# Patient Record
Sex: FEMALE | Race: WHITE | Employment: UNEMPLOYED | URBAN - METROPOLITAN AREA
[De-identification: names, ages, dates, MRNs, and addresses within clinical notes are randomized per-mention and may not be internally consistent; named-entity substitution may affect disease eponyms.]

---

## 2024-01-18 ENCOUNTER — OFFICE VISIT (OUTPATIENT)
Dept: OBGYN CLINIC | Facility: CLINIC | Age: 29
End: 2024-01-18

## 2024-01-18 VITALS
HEIGHT: 69 IN | WEIGHT: 161 LBS | HEART RATE: 69 BPM | SYSTOLIC BLOOD PRESSURE: 130 MMHG | DIASTOLIC BLOOD PRESSURE: 83 MMHG | RESPIRATION RATE: 18 BRPM | BODY MASS INDEX: 23.85 KG/M2

## 2024-01-18 DIAGNOSIS — Z78.9 LANGUAGE BARRIER: ICD-10-CM

## 2024-01-18 DIAGNOSIS — Z30.09 ENCOUNTER FOR COUNSELING REGARDING CONTRACEPTION: Primary | ICD-10-CM

## 2024-01-18 DIAGNOSIS — Z30.013 ENCOUNTER FOR INITIAL PRESCRIPTION OF INJECTABLE CONTRACEPTIVE: ICD-10-CM

## 2024-01-18 PROBLEM — Z75.8 LANGUAGE BARRIER: Status: ACTIVE | Noted: 2024-01-18

## 2024-01-18 PROBLEM — Z60.3 LANGUAGE BARRIER: Status: ACTIVE | Noted: 2024-01-18

## 2024-01-18 PROCEDURE — 99203 OFFICE O/P NEW LOW 30 MIN: CPT | Performed by: NURSE PRACTITIONER

## 2024-01-18 RX ORDER — MEDROXYPROGESTERONE ACETATE 150 MG/ML
150 INJECTION, SUSPENSION INTRAMUSCULAR
Qty: 1 ML | Refills: 1 | Status: SHIPPED | OUTPATIENT
Start: 2024-01-18

## 2024-01-18 NOTE — PROGRESS NOTES
Assessment/Plan:    No problem-specific Assessment & Plan notes found for this encounter.       Diagnoses and all orders for this visit:    Encounter for counseling regarding contraception  -     medroxyPROGESTERone (DEPO-PROVERA) 150 mg/mL injection; Inject 1 mL (150 mg total) into a muscle every 3 (three) months    Encounter for initial prescription of injectable contraceptive  -     medroxyPROGESTERone (DEPO-PROVERA) 150 mg/mL injection; Inject 1 mL (150 mg total) into a muscle every 3 (three) months  Patient to  injection, discount card given to patient for good Rx.  Return to office on Monday for her injection    Reviewed Depo Provera for contraception, use, side effects, efficacy, injection schedule,  reviewed irregular bleeding pattern, possible 5 lb weight gain the first year, risk for decrease in bone density- recommend daily dietary calcium, daily multivitamin with vitamin D.  Reviewed BUM x7 days. Reviewed to abstain or use BUM when late for injections.    Language barrier          Subjective:      Patient ID: Jasmin Jimenez is a 28 y.o. female.    HPI 27 yo   here for contraception. She was on contraception  injection in [place], last injection was in August.  Menses were monthly without contraception, she does report history of irregular menses where she will sometimes skip 1 month.  She is sexually active and uses condoms. Her LMP was 2023. Same partner x 1 year.  She reports   used [id number].   Last annual/pap was 1 year ago and was negative per pt    The following portions of the patient's history were reviewed and updated as appropriate: allergies, current medications, past family history, past medical history, past social history, past surgical history, and problem list.    Review of Systems   Respiratory: Negative.     Cardiovascular: Negative.    Gastrointestinal: Negative.    Genitourinary:  Negative for menstrual problem.      "    Objective:      /83 (BP Location: Right arm, Patient Position: Sitting, Cuff Size: Adult)   Pulse 69   Resp 18   Ht 5' 8.9\" (1.75 m)   Wt 73 kg (161 lb)   LMP 12/30/2023 (Approximate)   BMI 23.85 kg/m²          Physical Exam  Constitutional:       Appearance: Normal appearance.   Cardiovascular:      Rate and Rhythm: Normal rate.   Pulmonary:      Effort: Pulmonary effort is normal.           "

## 2024-01-22 ENCOUNTER — CLINICAL SUPPORT (OUTPATIENT)
Dept: OBGYN CLINIC | Facility: CLINIC | Age: 29
End: 2024-01-22

## 2024-01-22 DIAGNOSIS — Z30.42 ENCOUNTER FOR MANAGEMENT AND INJECTION OF DEPO-PROVERA: Primary | ICD-10-CM

## 2024-01-22 LAB — SL AMB POCT URINE HCG: NEGATIVE

## 2024-01-22 PROCEDURE — 81025 URINE PREGNANCY TEST: CPT

## 2024-01-22 PROCEDURE — 96372 THER/PROPH/DIAG INJ SC/IM: CPT

## 2024-01-22 RX ORDER — MEDROXYPROGESTERONE ACETATE 150 MG/ML
150 INJECTION, SUSPENSION INTRAMUSCULAR
Status: SHIPPED | OUTPATIENT
Start: 2024-01-22

## 2024-01-22 RX ADMIN — MEDROXYPROGESTERONE ACETATE 150 MG: 150 INJECTION, SUSPENSION INTRAMUSCULAR at 14:54

## 2024-01-22 NOTE — PROGRESS NOTES
Depo-Provera     [x]   Patient provided box YES   1 Refills remain  Refills submitted no  Last  Annual Date / Birth control check : 01/18/2024  Last Depo date: 01/22/2024  Side effects: no  HCG: yes  if applicable: negative  Given by: Karrie Welch  Site: LD    Calcium supplement daily teaching  Condoms for 2 weeks following first injection dose.

## 2024-02-05 ENCOUNTER — ANNUAL EXAM (OUTPATIENT)
Dept: OBGYN CLINIC | Facility: CLINIC | Age: 29
End: 2024-02-05

## 2024-02-05 VITALS
HEART RATE: 97 BPM | BODY MASS INDEX: 26.46 KG/M2 | DIASTOLIC BLOOD PRESSURE: 72 MMHG | SYSTOLIC BLOOD PRESSURE: 112 MMHG | HEIGHT: 65 IN | WEIGHT: 158.8 LBS

## 2024-02-05 DIAGNOSIS — Z20.2 POSSIBLE EXPOSURE TO STD: ICD-10-CM

## 2024-02-05 DIAGNOSIS — Z01.419 ENCOUNTER FOR GYNECOLOGICAL EXAMINATION WITHOUT ABNORMAL FINDING: Primary | ICD-10-CM

## 2024-02-05 DIAGNOSIS — Z78.9 LANGUAGE BARRIER: ICD-10-CM

## 2024-02-05 DIAGNOSIS — Z30.42 ENCOUNTER FOR SURVEILLANCE OF INJECTABLE CONTRACEPTIVE: ICD-10-CM

## 2024-02-05 PROCEDURE — 87591 N.GONORRHOEAE DNA AMP PROB: CPT | Performed by: NURSE PRACTITIONER

## 2024-02-05 PROCEDURE — G0145 SCR C/V CYTO,THINLAYER,RESCR: HCPCS | Performed by: NURSE PRACTITIONER

## 2024-02-05 PROCEDURE — 87491 CHLMYD TRACH DNA AMP PROBE: CPT | Performed by: NURSE PRACTITIONER

## 2024-02-05 PROCEDURE — 99395 PREV VISIT EST AGE 18-39: CPT | Performed by: NURSE PRACTITIONER

## 2024-02-05 RX ORDER — MEDROXYPROGESTERONE ACETATE 150 MG/ML
150 INJECTION, SUSPENSION INTRAMUSCULAR
Qty: 1 ML | Refills: 3 | Status: SHIPPED | OUTPATIENT
Start: 2024-02-05

## 2024-02-05 NOTE — PROGRESS NOTES
ASSESSMENT & PLAN: Jasmin Jimenez is a 28 y.o.  with normal gynecologic exam.    1.  Routine well woman exam done today  2.  Pap and HPV:  The patient's last pap was 1 year ago, was done in [place].    It was normal per patient.    Pap was done today.    Current ASCCP Guidelines reviewed.   3.  The following were reviewed in today's visit: breast self exam, STD testing, HIV risk factors and prevention, adequate intake of calcium and vitamin D, exercise, and healthy diet. Desires STD testing and was ordered   4. Depo provera for contraception.Rx was renewed for the year. RTO for next Depo when due     Depression Screening Follow-up Plan: Patient's depression screening was negative with a PHQ-2 score of 1. Their PHQ-9 score was 3. Clinically patient does not have depression. No treatment is required.    BMI Counseling: Body mass index is 26.43 kg/m². The BMI is above normal. Nutrition recommendations include 3-5 servings of fruits/vegetables daily, moderation in carbohydrate intake, and reducing intake of cholesterol. Exercise recommendations include exercising 3-5 times per week.    CC:  Annual Gynecologic Examination    HPI: Jasmin Jimenez is a 28 y.o.  who presents for annual gynecologic examination. [id number] used.    She  is due for her Pap smear today.  She was started on Depo-Provera 2024 for contraception  She has the following concerns:  none    Health Maintenance:    She wears her seatbelt routinely.    She does perform irregular monthly self breast exams.    She feels safe at home.     Past Medical History:   Diagnosis Date    Migraine        History reviewed. No pertinent surgical history.    Past OB/Gyn History:  OB History          1    Para   1    Term   1            AB        Living   1         SAB        IAB        Ectopic        Multiple        Live Births   1               Pt does not have menstrual issues. Monthly x6 d    History of sexually transmitted infection: No.  History of abnormal pap smears: No .    Patient is currently sexually active.  heterosexual.  The current method of family planning is Depo-Provera injections.    Family History   Problem Relation Age of Onset    No Known Problems Mother     No Known Problems Father     No Known Problems Brother     No Known Problems Brother     Breast cancer Maternal Grandmother     No Known Problems Son     Deep vein thrombosis Neg Hx     Pulmonary embolism Neg Hx     Stroke Neg Hx     Heart attack Neg Hx     Colon cancer Neg Hx     Ovarian cancer Neg Hx        Social History:  Social History     Socioeconomic History    Marital status: Single     Spouse name: Not on file    Number of children: Not on file    Years of education: Not on file    Highest education level: Not on file   Occupational History    Not on file   Tobacco Use    Smoking status: Never    Smokeless tobacco: Never   Vaping Use    Vaping status: Never Used   Substance and Sexual Activity    Alcohol use: Never    Drug use: Never    Sexual activity: Yes     Partners: Male     Birth control/protection: Condom Male, Injection   Other Topics Concern    Not on file   Social History Narrative    Not on file     Social Determinants of Health     Financial Resource Strain: Low Risk  (1/18/2024)    Overall Financial Resource Strain (CARDIA)     Difficulty of Paying Living Expenses: Not hard at all   Food Insecurity: No Food Insecurity (1/18/2024)    Hunger Vital Sign     Worried About Running Out of Food in the Last Year: Never true     Ran Out of Food in the Last Year: Never true   Transportation Needs: No Transportation Needs (1/18/2024)    PRAPARE - Transportation     Lack of Transportation (Medical): No     Lack of Transportation (Non-Medical): No   Physical Activity: Not on file   Stress: Not on file   Social Connections: Not on file   Intimate Partner Violence: Not At Risk (2/5/2024)    Humiliation, Afraid, Rape, and  "Kick questionnaire     Fear of Current or Ex-Partner: No     Emotionally Abused: No     Physically Abused: No     Sexually Abused: No   Housing Stability: Low Risk  (1/18/2024)    Housing Stability Vital Sign     Unable to Pay for Housing in the Last Year: No     Number of Places Lived in the Last Year: 2     Unstable Housing in the Last Year: No     Presently lives with family.  Patient is single.  Patient is currently employed     No Known Allergies      Current Outpatient Medications:     medroxyPROGESTERone (DEPO-PROVERA) 150 mg/mL injection, Inject 1 mL (150 mg total) into a muscle every 3 (three) months, Disp: 1 mL, Rfl: 3    Current Facility-Administered Medications:     medroxyPROGESTERone acetate (DEPO-PROVERA SYRINGE) IM injection 150 mg, 150 mg, Intramuscular, Q3 Months, OZZY Nunes, 150 mg at 01/22/24 1454      Review of Systems  Constitutional :no fever, feels well, no tiredness, no recent weight gain or loss  ENT: no ear ache, no loss of hearing, no nosebleeds or nasal discharge, no sore throat or hoarseness.  Cardiovascular: no complaints of slow or fast heart beat, no chest pain, no palpitations, no leg claudication or lower extremity edema.  Respiratory: no complaints of shortness of shortness of breath, no HERBERT  Breasts:no complaints of breast pain, breast lump, or nipple discharge  Gastrointestinal: no complaints of abdominal pain, constipation, nausea, vomiting, or diarrhea or bloody stools  Genitourinary : no complaints of dysuria, incontinence, pelvic pain, no dysmenorrhea, vaginal discharge or abnormal vaginal bleeding and as noted in HPI.  Musculoskeletal: no complaints of arthralgia, no myalgia, no joint swelling or stiffness, no limb pain or swelling.  Integumentary: no complaints of skin rash or lesion, itching or dry skin  Neurological: no complaints of headache, no confusion, no numbness or tingling, no dizziness or fainting    Objective      /72   Pulse 97   Ht 5' 5\" " (1.651 m)   Wt 72 kg (158 lb 12.8 oz)   LMP 01/30/2024 (Approximate)   BMI 26.43 kg/m²   General:   appears stated age, cooperative, alert normal mood and affect   Neck: normal, supple,trachea midline, no masses   Heart: regular rate and rhythm, S1, S2 normal, no murmur, click, rub or gallop   Lungs: clear to auscultation bilaterally   Breasts: normal appearance, no masses or tenderness, Inspection negative, No nipple retraction or dimpling, No nipple discharge or bleeding, No axillary or supraclavicular adenopathy, Normal to palpation without dominant masses, Taught monthly breast self examination   Abdomen: soft, non-tender, without masses or organomegaly   Vulva: normal female genitalia, Bartholin's, Urethra, Germania normal   Vagina: normal vagina, no discharge, exudate, lesion, or erythema   Urethra: normal   Cervix: PAP done. Friable. GCC done. Nontender.   Uterus: normal size, contour, position, consistency, mobility, non-tender, anteflexed, and mobile   Adnexa: normal adnexa and no mass, fullness, tenderness   Lymphatic palpation of lymph nodes in neck, axilla, groin and/or other locations: no lymphadenopathy or masses noted   Skin normal skin turgor and no rashes.   Psychiatric orientation to person, place, and time: normal. mood and affect: normal

## 2024-02-06 LAB
C TRACH DNA SPEC QL NAA+PROBE: NEGATIVE
N GONORRHOEA DNA SPEC QL NAA+PROBE: NEGATIVE

## 2024-02-08 ENCOUNTER — TELEPHONE (OUTPATIENT)
Dept: OBGYN CLINIC | Facility: CLINIC | Age: 29
End: 2024-02-08

## 2024-02-08 NOTE — TELEPHONE ENCOUNTER
----- Message from OZZY Nunes sent at 2/6/2024  1:20 PM EST -----  Please inform pt that her culture for chlamydia and gonorrhea were negative.  Thank You!

## 2024-02-08 NOTE — TELEPHONE ENCOUNTER
ID [id number]    Attempted to call, left a message referring patient to my chart to review recent test results. Office number provided in message in case of any questions or concerns.

## 2024-02-12 LAB
LAB AP GYN PRIMARY INTERPRETATION: NORMAL
Lab: NORMAL

## 2024-02-15 ENCOUNTER — TELEPHONE (OUTPATIENT)
Dept: OBGYN CLINIC | Facility: CLINIC | Age: 29
End: 2024-02-15

## 2024-02-15 NOTE — TELEPHONE ENCOUNTER
Message left for patient that results are available in MYCHART.  Encouraged to call office with any questions/concerns.

## 2024-02-15 NOTE — TELEPHONE ENCOUNTER
----- Message from OZZY Nunes sent at 2/14/2024 10:20 PM EST -----  Please inform pt that her pap was negative.  Thanks

## 2024-02-21 PROBLEM — Z01.419 ENCOUNTER FOR GYNECOLOGICAL EXAMINATION WITHOUT ABNORMAL FINDING: Status: RESOLVED | Noted: 2024-02-05 | Resolved: 2024-02-21

## 2024-04-09 ENCOUNTER — CLINICAL SUPPORT (OUTPATIENT)
Dept: OBGYN CLINIC | Facility: CLINIC | Age: 29
End: 2024-04-09

## 2024-04-09 DIAGNOSIS — Z30.42 ENCOUNTER FOR MANAGEMENT AND INJECTION OF DEPO-PROVERA: Primary | ICD-10-CM

## 2024-04-09 PROCEDURE — 96372 THER/PROPH/DIAG INJ SC/IM: CPT

## 2024-04-09 RX ADMIN — MEDROXYPROGESTERONE ACETATE 150 MG: 150 INJECTION, SUSPENSION INTRAMUSCULAR at 15:41

## 2024-04-09 NOTE — PROGRESS NOTES
Depo-Provera     [x]   Patient provided box YES   3 Refills remain  Refills submitted no  Last  Annual Date / Birth control check : 01/18/2024  Last Depo date: 01/22/2024  Side effects: no  HCG: yes  if applicable: negative  Given by: Karrie Welch  Site: LD    Calcium supplement daily teaching  Condoms for 2 weeks following first injection dose.

## 2024-06-24 ENCOUNTER — TELEPHONE (OUTPATIENT)
Dept: OTHER | Facility: OTHER | Age: 29
End: 2024-06-24

## 2024-06-24 NOTE — TELEPHONE ENCOUNTER
ID [id number]    Called and spoke to patient, patient is unable to make it to the openings the office has available tomorrow, June 25th. Patient stated she will keep her appt on June 26

## 2024-06-24 NOTE — TELEPHONE ENCOUNTER
Patient would like to have her injection appointment moved to tomorrow June 25, 2024 if possible. Please call patient to follow up.

## 2024-06-26 ENCOUNTER — CLINICAL SUPPORT (OUTPATIENT)
Dept: OBGYN CLINIC | Facility: CLINIC | Age: 29
End: 2024-06-26

## 2024-06-26 DIAGNOSIS — Z30.42 ENCOUNTER FOR MANAGEMENT AND INJECTION OF DEPO-PROVERA: Primary | ICD-10-CM

## 2024-06-26 PROCEDURE — 96372 THER/PROPH/DIAG INJ SC/IM: CPT

## 2024-06-26 RX ADMIN — MEDROXYPROGESTERONE ACETATE 150 MG: 150 INJECTION, SUSPENSION INTRAMUSCULAR at 15:40

## 2024-06-26 NOTE — PROGRESS NOTES
Depo-Provera     [x]   Patient provided box yes   2 Refills remain  Refills submitted no  Last  Annual Date / Birth control check : 2/5/24  Last Depo date: 4/9/24  Side effects: no  HCG: no  if applicable:   Given by: Trang Miranda MA   Site: left deltoid     Calcium supplement daily teaching  Condoms for 2 weeks following first injection dose.

## 2024-07-22 ENCOUNTER — HOSPITAL ENCOUNTER (EMERGENCY)
Facility: HOSPITAL | Age: 29
Discharge: HOME | End: 2024-07-22
Attending: INTERNAL MEDICINE

## 2024-07-22 VITALS
BODY MASS INDEX: 26.46 KG/M2 | RESPIRATION RATE: 18 BRPM | OXYGEN SATURATION: 98 % | DIASTOLIC BLOOD PRESSURE: 72 MMHG | HEART RATE: 76 BPM | SYSTOLIC BLOOD PRESSURE: 123 MMHG | HEIGHT: 65 IN | TEMPERATURE: 98.7 F

## 2024-07-22 DIAGNOSIS — U07.1 COVID-19: Primary | ICD-10-CM

## 2024-07-22 LAB
BACTERIA UR QL AUTO: ABNORMAL /HPF
BILIRUB UR QL STRIP: NEGATIVE
CLARITY UR: CLEAR
COLOR UR: YELLOW
EXT PREGNANCY TEST URINE: NEGATIVE
FLUAV RNA RESP QL NAA+PROBE: NEGATIVE
FLUBV RNA RESP QL NAA+PROBE: NEGATIVE
GLUCOSE UR STRIP-MCNC: NEGATIVE MG/DL
HGB UR QL STRIP.AUTO: ABNORMAL
KETONES UR STRIP-MCNC: NEGATIVE MG/DL
LEUKOCYTE ESTERASE UR QL STRIP: ABNORMAL
MUCOUS THREADS UR QL AUTO: ABNORMAL
NITRITE UR QL STRIP: NEGATIVE
NON-SQ EPI CELLS URNS QL MICRO: ABNORMAL /HPF
PH UR STRIP.AUTO: 6 [PH]
PROT UR STRIP-MCNC: NEGATIVE MG/DL
RBC #/AREA URNS AUTO: ABNORMAL /HPF
RSV RNA RESP QL NAA+PROBE: NEGATIVE
S PYO DNA THROAT QL NAA+PROBE: NOT DETECTED
SARS-COV-2 RNA RESP QL NAA+PROBE: POSITIVE
SP GR UR STRIP.AUTO: >=1.03 (ref 1–1.03)
UROBILINOGEN UR QL STRIP.AUTO: 0.2 E.U./DL
WBC #/AREA URNS AUTO: ABNORMAL /HPF

## 2024-07-22 PROCEDURE — 99284 EMERGENCY DEPT VISIT MOD MDM: CPT | Performed by: PHYSICIAN ASSISTANT

## 2024-07-22 PROCEDURE — 81025 URINE PREGNANCY TEST: CPT | Performed by: PHYSICIAN ASSISTANT

## 2024-07-22 PROCEDURE — 81001 URINALYSIS AUTO W/SCOPE: CPT | Performed by: PHYSICIAN ASSISTANT

## 2024-07-22 PROCEDURE — 99283 EMERGENCY DEPT VISIT LOW MDM: CPT

## 2024-07-22 PROCEDURE — 87651 STREP A DNA AMP PROBE: CPT | Performed by: PHYSICIAN ASSISTANT

## 2024-07-22 PROCEDURE — 0241U HB NFCT DS VIR RESP RNA 4 TRGT: CPT | Performed by: PHYSICIAN ASSISTANT

## 2024-07-22 RX ORDER — IBUPROFEN 600 MG/1
600 TABLET ORAL ONCE
Status: COMPLETED | OUTPATIENT
Start: 2024-07-22 | End: 2024-07-22

## 2024-07-22 RX ORDER — PREDNISONE 20 MG/1
60 TABLET ORAL ONCE
Status: COMPLETED | OUTPATIENT
Start: 2024-07-22 | End: 2024-07-22

## 2024-07-22 RX ADMIN — PREDNISONE 60 MG: 20 TABLET ORAL at 10:02

## 2024-07-22 RX ADMIN — IBUPROFEN 600 MG: 600 TABLET, FILM COATED ORAL at 10:02

## 2024-07-22 NOTE — DISCHARGE INSTRUCTIONS
Use Tylenol every 4 hours or Ibuprofen every 6 hours; you can alternate the 2 medications taking something every 3 hours for pain or fever.      Your COVID test is positive.    Please stay quarantined/isolation in your home.  Do not interact with your family or other people in the community.    You must stay isolation/quarantine for a minimum of 5 days after symptoms have begun and be symptom-free (without fever) for 24 hours before you go out of quarantine/isolation. Then follow strict mask wearing for an additional 5 days.  Get rest and drink plenty of fluids,    Follow-up with your family doctor   Return to the ED with any worsening symptoms

## 2024-07-22 NOTE — Clinical Note
Jasmin Randolph Yonatanerlindaatifjocelin was seen and treated in our emergency department on 7/22/2024.                Diagnosis:     Jasmin  may return to work on return date.    She may return on this date: 07/25/2024         If you have any questions or concerns, please don't hesitate to call.      Sybil Bullock PA-C    ______________________________           _______________          _______________  Hospital Representative                              Date                                Time

## 2024-07-22 NOTE — ED PROVIDER NOTES
History  Chief Complaint   Patient presents with    Pain     Reports bone and eye pain, nausea and dizziness since Thursday, taking Tylenol with mild improvement     HPI: Patient is a 28 y.o. female who presents with 4 days of dry cough, intermittent, generalized headache, sore throat, myalgias, and nausea which the patient describes at mild .  The patient has had contact with people with similar symptoms, child is home with fever as well.    The patient has Tylenol at home, but didn't take anything for symptoms today.    No Known Allergies    Past Medical History:  No date: Migraine   History reviewed. No pertinent surgical history.  Social History    Tobacco Use      Smoking status: Never      Smokeless tobacco: Never    Vaping Use      Vaping status: Never Used    Alcohol use: Never    Drug use: Never      Nursing notes reviewed  Physical Exam:  ED Triage Vitals [07/22/24 0926]  Temperature: 98.7 °F (37.1 °C)  Pulse: 76  Respirations: 18  Blood Pressure: 123/72  SpO2: 98 %  Temp Source: Oral  Heart Rate Source: Monitor  Patient Position - Orthostatic VS: Lying  BP Location: Left arm  FiO2 (%): n/a  Pain Score: 7    ROS: Positive for cough, headache, sore throat, myalgias, nausea, the remainder of a 10 organ system ROS was otherwise unremarkable.    General: awake, alert, no acute distress  Head: normocephalic, atraumatic  Eyes: no scleral icterus  Ears: external ears normal, hearing grossly intact, TMs intact bilaterally, no erythema  Pharynx:, Mild diffuse erythema noted along posterior pharynx, no exudate, uvula midline, no sign of abscess  Nose: external exam grossly normal, negative nasal discharge  Neck: symmetric, No JVD noted, trachea midline, no lymphangitis  Pulmonary: no respiratory distress, no tachypnea noted, no wheezing, no rhonchi  Cardiovascular: appears well perfused  Abdomen: no distention noted  Musculoskeletal: no deformities noted, tone normal  Neuro: grossly non-focal  Psych: mood and affect  appropriate    The patient is stable and has a history and physical exam consistent with a viral illness. COVID19 testing/strep testing has been performed.  I considered the patient's other medical conditions as applicable/noted above in my medical decision making.  The patient is stable upon discharge. The plan is for supportive care at home.    The patient (and any family present) verbalized understanding of the discharge instructions and warnings that would necessitate return to the Emergency Department.  All questions were answered prior to discharge.    Medications  ibuprofen (MOTRIN) tablet 600 mg (has no administration in time range)  predniSONE tablet 60 mg (has no administration in time range)  Final diagnoses:  None  ED Disposition     None      Follow-up Information    None     Patient's Medications    Discharge Prescriptions  No medications on file    No discharge procedures on file.    Electronically Signed by            Prior to Admission Medications   Prescriptions Last Dose Informant Patient Reported? Taking?   medroxyPROGESTERone (DEPO-PROVERA) 150 mg/mL injection   No No   Sig: Inject 1 mL (150 mg total) into a muscle every 3 (three) months      Facility-Administered Medications Last Administration Doses Remaining   medroxyPROGESTERone acetate (DEPO-PROVERA SYRINGE) IM injection 150 mg 6/26/2024  3:40 PM           Past Medical History:   Diagnosis Date    Migraine        History reviewed. No pertinent surgical history.    Family History   Problem Relation Age of Onset    No Known Problems Mother     No Known Problems Father     No Known Problems Brother     No Known Problems Brother     Breast cancer Maternal Grandmother     No Known Problems Son     Deep vein thrombosis Neg Hx     Pulmonary embolism Neg Hx     Stroke Neg Hx     Heart attack Neg Hx     Colon cancer Neg Hx     Ovarian cancer Neg Hx      I have reviewed and agree with the history as documented.    E-Cigarette/Vaping    E-Cigarette  Use Never User      E-Cigarette/Vaping Substances    Nicotine No     THC No     CBD No     Flavoring No     Other No     Unknown No      Social History     Tobacco Use    Smoking status: Never    Smokeless tobacco: Never   Vaping Use    Vaping status: Never Used   Substance Use Topics    Alcohol use: Never    Drug use: Never       Review of Systems    See above      Physical Exam  Physical Exam    See above    Vital Signs  ED Triage Vitals [07/22/24 0926]   Temperature Pulse Respirations Blood Pressure SpO2   98.7 °F (37.1 °C) 76 18 123/72 98 %      Temp Source Heart Rate Source Patient Position - Orthostatic VS BP Location FiO2 (%)   Oral Monitor Lying Left arm --      Pain Score       7           Vitals:    07/22/24 0926   BP: 123/72   Pulse: 76   Patient Position - Orthostatic VS: Lying         Visual Acuity      ED Medications  Medications   ibuprofen (MOTRIN) tablet 600 mg (600 mg Oral Given 7/22/24 1002)   predniSONE tablet 60 mg (60 mg Oral Given 7/22/24 1002)       Diagnostic Studies  Results Reviewed       Procedure Component Value Units Date/Time    FLU/RSV/COVID - if FLU/RSV clinically relevant [[id number]]  (Abnormal) Collected: 07/22/24 1002    Lab Status: Final result Specimen: Nares from Nose Updated: 07/22/24 1102     SARS-CoV-2 Positive     INFLUENZA A PCR Negative     INFLUENZA B PCR Negative     RSV PCR Negative    Narrative:      FOR PEDIATRIC PATIENTS - copy/paste COVID Guidelines URL to browser: [email or web address]    SARS-CoV-2 assay is a Nucleic Acid Amplification assay intended for the  qualitative detection of nucleic acid from SARS-CoV-2 in nasopharyngeal  swabs. Results are for the presumptive identification of SARS-CoV-2 RNA.    Positive results are indicative of infection with SARS-CoV-2, the virus  causing COVID-19, but do not rule out bacterial infection or co-infection  with other viruses. Laboratories within the [place] and  its  territories are required to report all positive results to the appropriate  public health authorities. Negative results do not preclude SARS-CoV-2  infection and should not be used as the sole basis for treatment or other  patient management decisions. Negative results must be combined with  clinical observations, patient history, and epidemiological information.  This test has not been FDA cleared or approved.    This test has been authorized by FDA under an Emergency Use Authorization  (EUA). This test is only authorized for the duration of time the  declaration that circumstances exist justifying the authorization of the  emergency use of an in vitro diagnostic tests for detection of SARS-CoV-2  virus and/or diagnosis of COVID-19 infection under section 564(b)(1) of  the Act, 21 U.S.C. 360bbb-3(b)(1), unless the authorization is terminated  or revoked sooner. The test has been validated but independent review by FDA  and CLIA is pending.    Test performed using [employer] [facility]: This RT-PCR assay targets N2,  a region unique to SARS-CoV-2. A conserved region in the E-gene was chosen  for pan-Sarbecovirus detection which includes SARS-CoV-2.    According to CMS-2020-01-R, this platform meets the definition of high-throughput technology.    Strep A PCR [[id number]]  (Normal) Collected: 07/22/24 1002    Lab Status: Final result Specimen: Throat Updated: 07/22/24 1047     STREP A PCR Not Detected    Urine Microscopic [[id number]]  (Abnormal) Collected: 07/22/24 1002    Lab Status: Final result Specimen: Urine, Clean Catch Updated: 07/22/24 1040     RBC, UA None Seen /hpf      WBC, UA 0-5 /hpf      Epithelial Cells Moderate /hpf      Bacteria, UA Moderate /hpf      MUCUS THREADS Moderate    UA w Reflex to Microscopic w Reflex to Culture [[id number]]  (Abnormal) Collected: 07/22/24 1002    Lab Status: Final result Specimen: Urine, Clean Catch Updated: 07/22/24 1019     Color, UA Yellow     Clarity, UA Clear      Specific Gravity, UA >=1.030     pH, UA 6.0     Leukocytes, UA Trace     Nitrite, UA Negative     Protein, UA Negative mg/dl      Glucose, UA Negative mg/dl      Ketones, UA Negative mg/dl      Urobilinogen, UA 0.2 E.U./dl      Bilirubin, UA Negative     Occult Blood, UA Trace-Intact    POCT pregnancy, urine [[id number]]  (Normal) Resulted: 07/22/24 1000    Lab Status: Final result Updated: 07/22/24 1004     EXT Preg Test, Ur Negative     Control --                   No orders to display              Procedures  Procedures         ED Course  ED Course as of 07/22/24 1153   Mon Jul 22, 2024   1024 PREGNANCY TEST URINE: Negative   1024 Leukocytes, UA(!): Trace  With Nitrite neg   1108 STREP A PCR: Not Detected   1108 SARS-COV-2(!): Positive                                               Medical Decision Making  Amount and/or Complexity of Data Reviewed  Labs: ordered. Decision-making details documented in ED Course.    Risk  Prescription drug management.                 Disposition  Final diagnoses:   COVID-19     Time reflects when diagnosis was documented in both MDM as applicable and the Disposition within this note       Time User Action Codes Description Comment    7/22/2024 11:31 AM Sybil Bullock Add [U07.1] COVID-19           ED Disposition       ED Disposition   Discharge    Condition   Stable    Date/Time   Mon Jul 22, 2024 11:31 AM    Comment   Jasmin Jimenez discharge to home/self care.                   Follow-up Information       Follow up With Specialties Details Why Contact Info    Your PCP                Discharge Medication List as of 7/22/2024 11:33 AM        CONTINUE these medications which have NOT CHANGED    Details   medroxyPROGESTERone (DEPO-PROVERA) 150 mg/mL injection Inject 1 mL (150 mg total) into a muscle every 3 (three) months, Starting Mon 2/5/2024, Normal             No discharge procedures on file.    PDMP Review       None            ED Provider  Electronically Signed  by             Sybil Bullock PA-C  07/22/24 1158

## 2024-09-12 ENCOUNTER — CLINICAL SUPPORT (OUTPATIENT)
Dept: OBGYN CLINIC | Facility: CLINIC | Age: 29
End: 2024-09-12

## 2024-09-12 DIAGNOSIS — Z30.42 ENCOUNTER FOR MANAGEMENT AND INJECTION OF DEPO-PROVERA: Primary | ICD-10-CM

## 2024-09-12 PROCEDURE — 96372 THER/PROPH/DIAG INJ SC/IM: CPT

## 2024-09-12 RX ADMIN — MEDROXYPROGESTERONE ACETATE 150 MG: 150 INJECTION, SUSPENSION INTRAMUSCULAR at 15:33

## 2024-09-12 NOTE — PROGRESS NOTES
Depo-Provera     [x]   Patient provided box yes   1 Refills remain  Refills submitted no  Last  Annual Date / Birth control check : 02/05/24  Last Depo date: 06/26/24  Side effects: no  HCG: no  if applicable:   Given by: Trang Miranda MA   Site: left deltoid     Calcium supplement daily teaching  Condoms for 2 weeks following first injection dose.

## 2024-09-13 ENCOUNTER — OFFICE VISIT (OUTPATIENT)
Dept: OBGYN CLINIC | Facility: CLINIC | Age: 29
End: 2024-09-13

## 2024-09-13 VITALS
HEIGHT: 65 IN | TEMPERATURE: 98.2 F | RESPIRATION RATE: 18 BRPM | WEIGHT: 156.6 LBS | BODY MASS INDEX: 26.09 KG/M2 | SYSTOLIC BLOOD PRESSURE: 121 MMHG | DIASTOLIC BLOOD PRESSURE: 80 MMHG | HEART RATE: 111 BPM

## 2024-09-13 DIAGNOSIS — R39.9 UTI SYMPTOMS: Primary | ICD-10-CM

## 2024-09-13 LAB
SL AMB  POCT GLUCOSE, UA: NORMAL
SL AMB LEUKOCYTE ESTERASE,UA: NORMAL
SL AMB POCT BILIRUBIN,UA: NORMAL
SL AMB POCT BLOOD,UA: NORMAL
SL AMB POCT CLARITY,UA: NORMAL
SL AMB POCT COLOR,UA: YELLOW
SL AMB POCT KETONES,UA: NORMAL
SL AMB POCT NITRITE,UA: NORMAL
SL AMB POCT PH,UA: 6
SL AMB POCT SPECIFIC GRAVITY,UA: 1.01
SL AMB POCT URINE PROTEIN: NORMAL
SL AMB POCT UROBILINOGEN: NORMAL

## 2024-09-13 PROCEDURE — 87181 SC STD AGAR DILUTION PER AGT: CPT | Performed by: OBSTETRICS & GYNECOLOGY

## 2024-09-13 PROCEDURE — 87077 CULTURE AEROBIC IDENTIFY: CPT | Performed by: OBSTETRICS & GYNECOLOGY

## 2024-09-13 PROCEDURE — 87186 SC STD MICRODIL/AGAR DIL: CPT | Performed by: OBSTETRICS & GYNECOLOGY

## 2024-09-13 PROCEDURE — 81003 URINALYSIS AUTO W/O SCOPE: CPT | Performed by: OBSTETRICS & GYNECOLOGY

## 2024-09-13 PROCEDURE — 99213 OFFICE O/P EST LOW 20 MIN: CPT | Performed by: OBSTETRICS & GYNECOLOGY

## 2024-09-13 PROCEDURE — 87086 URINE CULTURE/COLONY COUNT: CPT | Performed by: OBSTETRICS & GYNECOLOGY

## 2024-09-13 RX ORDER — CIPROFLOXACIN 500 MG/1
500 TABLET, FILM COATED ORAL EVERY 12 HOURS SCHEDULED
Qty: 14 TABLET | Refills: 0 | Status: SHIPPED | OUTPATIENT
Start: 2024-09-13 | End: 2024-09-16

## 2024-09-13 RX ORDER — PHENAZOPYRIDINE HYDROCHLORIDE 95 MG/1
95 TABLET ORAL 3 TIMES DAILY PRN
Qty: 10 TABLET | Refills: 0 | Status: SHIPPED | OUTPATIENT
Start: 2024-09-13 | End: 2024-09-15

## 2024-09-13 NOTE — PROGRESS NOTES
"Assessment/Plan:     No problem-specific Assessment & Plan notes found for this encounter.          Diagnoses and all orders for this visit:    UTI symptoms  -     POCT urine dip auto non-scope  -     Urine culture  -     ciprofloxacin (CIPRO) 500 mg tablet; Take 1 tablet (500 mg total) by mouth every 12 (twelve) hours for 7 days  -     phenazopyridine (PYRIDIUM) 95 MG tablet; Take 1 tablet (95 mg total) by mouth 3 (three) times a day as needed for bladder spasms for up to 2 days    RTO prn or for annual.            Subjective:      Patient ID: Jasmin Jimenez is a 28 y.o. female who presents for an acute problem.     HPI  Patient reports 2 weeks of pain and burning with urination, nausea/vomiting, fever, bladder pain, and back pain unrelieved with ibuprofen or urinary pain relief. Patient denies any vaginal discharge or hematuria.     The following portions of the patient's history were reviewed and updated as appropriate: allergies, current medications, past family history, past medical history, past social history, past surgical history and problem list.    Review of Systems   Constitutional:  Positive for fever.   Gastrointestinal:  Positive for nausea and vomiting.   Genitourinary:  Positive for dysuria and flank pain. Negative for vaginal bleeding and vaginal discharge.         Objective:    Physical exam was positive for bilateral flank pain more pronounced on the right and suprapubic pain.     /80 (BP Location: Right arm, Patient Position: Sitting, Cuff Size: Adult)   Pulse (!) 111   Temp 98.2 °F (36.8 °C) (Tympanic)   Resp 18   Ht 5' 5\" (1.651 m)   Wt 71 kg (156 lb 9.6 oz)   LMP  (LMP Unknown)   BMI 26.06 kg/m²          Physical Exam  Vitals and nursing note reviewed. Exam conducted with a chaperone present.   Constitutional:       Appearance: Normal appearance.   Pulmonary:      Effort: Pulmonary effort is normal.   Abdominal:      Palpations: Abdomen is soft.      Tenderness: " There is abdominal tenderness in the suprapubic area. There is right CVA tenderness and left CVA tenderness.   Neurological:      General: No focal deficit present.      Mental Status: She is alert and oriented to person, place, and time.   Psychiatric:         Mood and Affect: Mood normal.         Behavior: Behavior normal.

## 2024-09-15 LAB — BACTERIA UR CULT: ABNORMAL

## 2024-09-16 ENCOUNTER — TELEPHONE (OUTPATIENT)
Dept: OBGYN CLINIC | Facility: CLINIC | Age: 29
End: 2024-09-16

## 2024-09-16 DIAGNOSIS — R39.9 UTI SYMPTOMS: Primary | ICD-10-CM

## 2024-09-16 LAB — BACTERIA UR CULT: ABNORMAL

## 2024-09-16 NOTE — TELEPHONE ENCOUNTER
ID [id number]    Called and spoke to patient, informed her of the new prescription. Patient verbalized understanding, no questions or concerns.

## 2024-09-16 NOTE — TELEPHONE ENCOUNTER
----- Message from Sara Jaramillo MD sent at 9/16/2024  8:44 AM EDT -----  Please inform urine was not susceptible to original antibiotic, I sent a new prescription to her pharmacy.    TY  ----- Message -----  From: Karrie Welch MA  Sent: 9/13/2024   8:12 AM EDT  To: Sara Jaramillo MD

## 2024-12-03 DIAGNOSIS — Z30.42 ENCOUNTER FOR SURVEILLANCE OF INJECTABLE CONTRACEPTIVE: ICD-10-CM

## 2024-12-03 RX ORDER — MEDROXYPROGESTERONE ACETATE 150 MG/ML
INJECTION, SUSPENSION INTRAMUSCULAR
Qty: 1 ML | Refills: 1 | Status: SHIPPED | OUTPATIENT
Start: 2024-12-03 | End: 2024-12-04 | Stop reason: SDUPTHER

## 2024-12-04 ENCOUNTER — CLINICAL SUPPORT (OUTPATIENT)
Dept: OBGYN CLINIC | Facility: CLINIC | Age: 29
End: 2024-12-04

## 2024-12-04 DIAGNOSIS — Z30.42 ENCOUNTER FOR MANAGEMENT AND INJECTION OF DEPO-PROVERA: Primary | ICD-10-CM

## 2024-12-04 DIAGNOSIS — Z30.42 ENCOUNTER FOR SURVEILLANCE OF INJECTABLE CONTRACEPTIVE: ICD-10-CM

## 2024-12-04 DIAGNOSIS — Z76.0 MEDICATION REFILL: Primary | ICD-10-CM

## 2024-12-04 PROCEDURE — 96372 THER/PROPH/DIAG INJ SC/IM: CPT

## 2024-12-04 RX ORDER — MEDROXYPROGESTERONE ACETATE 150 MG/ML
150 INJECTION, SUSPENSION INTRAMUSCULAR
Qty: 1 ML | Refills: 0 | Status: SHIPPED | OUTPATIENT
Start: 2024-12-04

## 2024-12-04 RX ADMIN — MEDROXYPROGESTERONE ACETATE 150 MG: 150 INJECTION, SUSPENSION INTRAMUSCULAR at 16:09

## 2024-12-04 NOTE — PROGRESS NOTES
Depo-Provera     [x]   Patient provided box YES   0 Refills remain  Refills submitted yes  Last  Annual Date / Birth control check : 02/05/2024  Last Depo date: 09/12/2024  Side effects: no  HCG: no  if applicable:   Given by: Karrie Welch MA    Site: LD    Calcium supplement daily teaching  Condoms for 2 weeks following first injection dose.

## 2025-03-11 ENCOUNTER — OFFICE VISIT (OUTPATIENT)
Dept: INTERNAL MEDICINE CLINIC | Facility: CLINIC | Age: 30
End: 2025-03-11

## 2025-03-11 VITALS
HEIGHT: 66 IN | BODY MASS INDEX: 25.88 KG/M2 | HEART RATE: 87 BPM | SYSTOLIC BLOOD PRESSURE: 104 MMHG | TEMPERATURE: 98.6 F | DIASTOLIC BLOOD PRESSURE: 69 MMHG | WEIGHT: 161 LBS

## 2025-03-11 DIAGNOSIS — M54.50 LOW BACK PAIN POTENTIALLY ASSOCIATED WITH RADICULOPATHY: Primary | ICD-10-CM

## 2025-03-11 PROCEDURE — 99203 OFFICE O/P NEW LOW 30 MIN: CPT | Performed by: FAMILY MEDICINE

## 2025-03-11 RX ORDER — METHOCARBAMOL 500 MG/1
TABLET, FILM COATED ORAL
Qty: 30 TABLET | Refills: 1 | Status: SHIPPED | OUTPATIENT
Start: 2025-03-11

## 2025-03-11 NOTE — ASSESSMENT & PLAN NOTE
See detailed HPI , 4 weeks of sx she never had low back pain before no injury no change in routine , she works as a  in [employer] 4 10 hr shifts per week . Pain is there in any position , not daily + radiating sx to feet bilaeral - numbness or tingling legs can feel weak , no bowel or bladder function changes . Exam normal muscle bulk , neuro exam normal , patient bends stands straight , moves onto and off of exam table easily  normal gait   Recommend avoid offending positions actions , maintain good posture , continue moist heat applications warm showers , advil 400 mg bid - tid with food , add robaxin 500 mg hs and up to 1 8 hr , given home stretches and exercises , follow up in 4 weeks sooner if needed   Orders:    methocarbamol (ROBAXIN) 500 mg tablet; 1 po hs and up to q 8 hr prn back pain

## 2025-03-11 NOTE — PROGRESS NOTES
Name: Jasmin Jimenez      : 1995      MRN: [id number]  Encounter Provider: Aidee Drew MD  Encounter Date: 3/11/2025   Encounter department: [facility]    Assessment & Plan  Low back pain potentially associated with radiculopathy  See detailed HPI , 4 weeks of sx she never had low back pain before no injury no change in routine , she works as a  in [employer] 4 10 hr shifts per week . Pain is there in any position , not daily + radiating sx to feet bilaeral - numbness or tingling legs can feel weak , no bowel or bladder function changes . Exam normal muscle bulk , neuro exam normal , patient bends stands straight , moves onto and off of exam table easily  normal gait   Recommend avoid offending positions actions , maintain good posture , continue moist heat applications warm showers , advil 400 mg bid - tid with food , add robaxin 500 mg hs and up to 1 8 hr , given home stretches and exercises , follow up in 4 weeks sooner if needed   Orders:    methocarbamol (ROBAXIN) 500 mg tablet; 1 po hs and up to q 8 hr prn back pain    BMI Counseling: Body mass index is 26.38 kg/m². The BMI is above normal. Nutrition recommendations include decreasing portion sizes, encouraging healthy choices of fruits and vegetables, decreasing fast food intake, consuming healthier snacks, limiting drinks that contain sugar and reducing intake of saturated and trans fat. Exercise recommendations include exercising 3-5 times per week. Rationale for BMI follow-up plan is due to patient being overweight or obese.     Depression Screening and Follow-up Plan: Patient was screened for depression during today's encounter. They screened negative with a PHQ-2 score of 0.          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 Numbness tingling , no issues with passing urine or moving bowels   She has used moist heat , calms down pain some , she has also taken advil 400 mg a few x per day some help   She has not had any back injuries ,no family history of back problems   Review of Systems   Constitutional:  Negative for chills and fever.   HENT:  Negative for ear pain and sore throat.    Eyes:  Negative for pain and visual disturbance.   Respiratory:  Negative for cough and shortness of breath.    Cardiovascular:  Negative for chest pain and palpitations.   Gastrointestinal:  Negative for abdominal pain and vomiting.   Genitourinary:  Negative for difficulty urinating, dysuria and hematuria.   Musculoskeletal:  Positive for back pain. Negative for arthralgias.   Skin:  Negative for color change and rash.   Neurological:  Negative for seizures and syncope.   Psychiatric/Behavioral:  Positive for sleep disturbance. Negative for self-injury and suicidal ideas.    All other systems reviewed and are negative.    Past Medical History:   Diagnosis Date    Migraine      History reviewed. No pertinent surgical history.  Family History   Problem Relation Age of Onset    No Known Problems Mother     No Known Problems Father     No Known Problems Brother     No Known Problems Brother     Breast cancer Maternal Grandmother     No Known Problems Son     Deep vein thrombosis Neg Hx     Pulmonary embolism Neg Hx     Stroke Neg Hx     Heart attack Neg Hx     Colon cancer Neg Hx     Ovarian cancer Neg Hx      Social History     Tobacco Use    Smoking status: Never    Smokeless tobacco: Never   Vaping Use     "Vaping status: Never Used   Substance and Sexual Activity    Alcohol use: Never    Drug use: Never    Sexual activity: Yes     Partners: Male     Birth control/protection: Condom Male, Injection     Current Outpatient Medications on File Prior to Visit   Medication Sig    medroxyPROGESTERone acetate (DEPO-PROVERA SYRINGE) 150 mg/mL injection Inject 1 mL (150 mg total) into a muscle every 3 (three) months     No Known Allergies    There is no immunization history on file for this patient.  Objective   /69 (BP Location: Left arm, Patient Position: Sitting, Cuff Size: Standard)   Pulse 87   Temp 98.6 °F (37 °C) (Temporal)   Ht 5' 5.5\" (1.664 m)   Wt 73 kg (161 lb)   BMI 26.38 kg/m²     Physical Exam  Vitals and nursing note reviewed.   Constitutional:       General: She is not in acute distress.     Appearance: She is well-developed.      Comments: Skin with good color turgor , well hydrated ,no distress noted     HENT:      Head: Normocephalic and atraumatic.   Eyes:      Conjunctiva/sclera: Conjunctivae normal.   Cardiovascular:      Rate and Rhythm: Normal rate and regular rhythm.      Heart sounds: No murmur heard.  Pulmonary:      Effort: Pulmonary effort is normal. No respiratory distress.      Breath sounds: Normal breath sounds.   Abdominal:      Palpations: Abdomen is soft.      Tenderness: There is no abdominal tenderness.   Musculoskeletal:         General: No swelling.      Cervical back: Neck supple.   Skin:     General: Skin is warm and dry.      Capillary Refill: Capillary refill takes less than 2 seconds.   Neurological:      Mental Status: She is alert.   Psychiatric:         Mood and Affect: Mood normal.         "

## 2025-07-01 ENCOUNTER — APPOINTMENT (EMERGENCY)
Dept: RADIOLOGY | Facility: HOSPITAL | Age: 30
End: 2025-07-01
Payer: COMMERCIAL

## 2025-07-01 ENCOUNTER — HOSPITAL ENCOUNTER (EMERGENCY)
Facility: HOSPITAL | Age: 30
Discharge: HOME | End: 2025-07-01
Attending: EMERGENCY MEDICINE | Admitting: EMERGENCY MEDICINE
Payer: COMMERCIAL

## 2025-07-01 VITALS
SYSTOLIC BLOOD PRESSURE: 115 MMHG | OXYGEN SATURATION: 100 % | DIASTOLIC BLOOD PRESSURE: 68 MMHG | HEART RATE: 73 BPM | RESPIRATION RATE: 16 BRPM | TEMPERATURE: 98.7 F

## 2025-07-01 DIAGNOSIS — S16.1XXA ACUTE STRAIN OF NECK MUSCLE, INITIAL ENCOUNTER: ICD-10-CM
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
## 2025-07-01 DIAGNOSIS — M54.50 LOW BACK PAIN POTENTIALLY ASSOCIATED WITH RADICULOPATHY: ICD-10-CM

## 2025-07-01 LAB
ATRIAL RATE: 74 BPM
P AXIS: 24 DEGREES
PR INTERVAL: 134 MS
QRS AXIS: 91 DEGREES
QRSD INTERVAL: 80 MS
QT INTERVAL: 412 MS
QTC INTERVAL: 457 MS
T WAVE AXIS: 48 DEGREES
VENTRICULAR RATE: 74 BPM

## 2025-07-01 PROCEDURE — 73564 X-RAY EXAM KNEE 4 OR MORE: CPT

## 2025-07-01 PROCEDURE — 72040 X-RAY EXAM NECK SPINE 2-3 VW: CPT

## 2025-07-01 PROCEDURE — 73030 X-RAY EXAM OF SHOULDER: CPT

## 2025-07-01 PROCEDURE — 99284 EMERGENCY DEPT VISIT MOD MDM: CPT

## 2025-07-01 PROCEDURE — 99284 EMERGENCY DEPT VISIT MOD MDM: CPT | Performed by: EMERGENCY MEDICINE

## 2025-07-01 PROCEDURE — 93005 ELECTROCARDIOGRAM TRACING: CPT

## 2025-07-01 PROCEDURE — 93010 ELECTROCARDIOGRAM REPORT: CPT | Performed by: INTERNAL MEDICINE

## 2025-07-01 RX ORDER — METHOCARBAMOL 500 MG/1
500 TABLET, FILM COATED ORAL 2 TIMES DAILY
Qty: 20 TABLET | Refills: 0 | Status: SHIPPED | OUTPATIENT
Start: 2025-07-01

## 2025-07-01 NOTE — ED NOTES
Provider does not want a pregnancy screen prior to X-ray     Frieda Puckett, MATA  07/01/25 0573

## 2025-07-01 NOTE — Clinical Note
Jasmin Jimenez was seen and treated in our emergency department on 7/1/2025.                Diagnosis:     Jsamin  may return to work on return date.    She may return on this date: 07/03/2025         If you have any questions or concerns, please don't hesitate to call.      Rosita Ellington MD    ______________________________           _______________          _______________  Hospital Representative                              Date                                Time

## 2025-07-01 NOTE — Clinical Note
Jasmin Jimenez was seen and treated in our emergency department on 7/1/2025.                Diagnosis:     Jasmin  may return to work on return date.    She may return on this date: 07/03/2025         If you have any questions or concerns, please don't hesitate to call.      Rosita Ellington MD    ______________________________           _______________          _______________  Hospital Representative                              Date                                Time
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
## 2025-07-28 ENCOUNTER — OFFICE VISIT (OUTPATIENT)
Dept: INTERNAL MEDICINE CLINIC | Facility: CLINIC | Age: 30
End: 2025-07-28

## 2025-07-28 VITALS
TEMPERATURE: 98.4 F | HEIGHT: 66 IN | DIASTOLIC BLOOD PRESSURE: 66 MMHG | BODY MASS INDEX: 26.1 KG/M2 | WEIGHT: 162.4 LBS | HEART RATE: 83 BPM | RESPIRATION RATE: 12 BRPM | OXYGEN SATURATION: 99 % | SYSTOLIC BLOOD PRESSURE: 102 MMHG

## 2025-07-28 DIAGNOSIS — R10.9 ABDOMINAL PAIN, UNSPECIFIED ABDOMINAL LOCATION: Primary | ICD-10-CM

## 2025-07-28 PROCEDURE — 99213 OFFICE O/P EST LOW 20 MIN: CPT | Performed by: INTERNAL MEDICINE

## 2025-07-28 RX ORDER — OMEPRAZOLE 40 MG/1
40 CAPSULE, DELAYED RELEASE ORAL DAILY
Qty: 30 CAPSULE | Refills: 2 | Status: SHIPPED | OUTPATIENT
Start: 2025-07-28